# Patient Record
Sex: MALE | Race: WHITE | Employment: FULL TIME | URBAN - METROPOLITAN AREA
[De-identification: names, ages, dates, MRNs, and addresses within clinical notes are randomized per-mention and may not be internally consistent; named-entity substitution may affect disease eponyms.]

---

## 2023-05-15 ENCOUNTER — OFFICE VISIT (OUTPATIENT)
Dept: UROLOGY | Age: 38
End: 2023-05-15

## 2023-05-15 DIAGNOSIS — Z30.09 VASECTOMY EVALUATION: Primary | ICD-10-CM

## 2023-05-15 ASSESSMENT — ENCOUNTER SYMPTOMS
EYES NEGATIVE: 1
RESPIRATORY NEGATIVE: 1

## 2023-05-15 NOTE — PROGRESS NOTES
[place] [address]  [phone]          Aminata Jones  : 1985    Chief Complaint   Patient presents with    Sterilization     Vas consult           HPI     Aminata Jones is a 40 y.o. male    The patient comes in today to discuss vasectomy. He and his wife have 2 children together and are quite sure they want no more. Past Medical History:   Diagnosis Date    Asthma      No past surgical history on file. No current outpatient medications on file. No current facility-administered medications for this visit. No Known Allergies  Social History     Socioeconomic History    Marital status:      Spouse name: Not on file    Number of children: Not on file    Years of education: Not on file    Highest education level: Not on file   Occupational History    Not on file   Tobacco Use    Smoking status: Never    Smokeless tobacco: Never   Substance and Sexual Activity    Alcohol use: Yes    Drug use: Not on file    Sexual activity: Yes     Partners: Female   Other Topics Concern    Not on file   Social History Narrative    Not on file     Social Determinants of Health     Financial Resource Strain: Not on file   Food Insecurity: Not on file   Transportation Needs: Not on file   Physical Activity: Not on file   Stress: Not on file   Social Connections: Not on file   Intimate Partner Violence: Not on file   Housing Stability: Not on file     No family history on file.     Review of Systems  Constitutional: Negative  Skin: Negative skin ROS  Eyes: Eyes negative  ENT: HENT negative  Respiratory: Respiratory negative  Cardiovascular: Neg cardio ROS  GI: Neg GI ROS  Genitourinary: Genitourinary negative  Musculoskeletal: Musculoskeletal negative  Neurological: Neg neuro ROS  Psychological: Neg psych ROS  Endocrine: Endocrine negative  Hem/Lymphatic: Hematologic/lymphatic negative      Assessment and Plan    ICD-10-CM    1. Vasectomy evaluation  Z30.09

## 2023-06-01 ENCOUNTER — OFFICE VISIT (OUTPATIENT)
Dept: UROLOGY | Age: 38
End: 2023-06-01

## 2023-06-01 VITALS — WEIGHT: 140 LBS | BODY MASS INDEX: 23.9 KG/M2 | HEIGHT: 64 IN

## 2023-06-01 DIAGNOSIS — Z30.2 ENCOUNTER FOR VASECTOMY: ICD-10-CM

## 2023-06-01 DIAGNOSIS — Z30.2 ENCOUNTER FOR VASECTOMY: Primary | ICD-10-CM

## 2023-06-01 RX ORDER — HYDROCODONE BITARTRATE AND ACETAMINOPHEN 10; 325 MG/1; MG/1
1 TABLET ORAL
Qty: 12 TABLET | Refills: 0 | Status: SHIPPED | OUTPATIENT
Start: 2023-06-01 | End: 2023-06-01 | Stop reason: SDUPTHER

## 2023-06-01 RX ORDER — HYDROCODONE BITARTRATE AND ACETAMINOPHEN 10; 325 MG/1; MG/1
1 TABLET ORAL
Qty: 12 TABLET | Refills: 0 | Status: SHIPPED | OUTPATIENT
Start: 2023-06-01 | End: 2023-07-01

## 2023-06-01 RX ORDER — CEPHALEXIN 500 MG/1
500 CAPSULE ORAL 3 TIMES DAILY
Qty: 9 CAPSULE | Refills: 0 | Status: SHIPPED | OUTPATIENT
Start: 2023-06-01 | End: 2023-06-01 | Stop reason: SDUPTHER

## 2023-06-01 RX ORDER — CEPHALEXIN 500 MG/1
500 CAPSULE ORAL 3 TIMES DAILY
Qty: 9 CAPSULE | Refills: 0 | Status: SHIPPED | OUTPATIENT
Start: 2023-06-01

## 2024-01-20 ENCOUNTER — APPOINTMENT (OUTPATIENT)
Dept: GENERAL RADIOLOGY | Age: 39
End: 2024-01-20
Payer: COMMERCIAL

## 2024-01-20 ENCOUNTER — HOSPITAL ENCOUNTER (EMERGENCY)
Age: 39
Discharge: HOME | End: 2024-01-20
Payer: COMMERCIAL

## 2024-01-20 VITALS
DIASTOLIC BLOOD PRESSURE: 77 MMHG | TEMPERATURE: 97.6 F | RESPIRATION RATE: 18 BRPM | BODY MASS INDEX: 23.9 KG/M2 | OXYGEN SATURATION: 97 % | SYSTOLIC BLOOD PRESSURE: 128 MMHG | HEIGHT: 64 IN | HEART RATE: 87 BPM | WEIGHT: 140 LBS

## 2024-01-20 DIAGNOSIS — S61.412A LACERATION OF LEFT HAND WITHOUT FOREIGN BODY, INITIAL ENCOUNTER: Primary | ICD-10-CM

## 2024-01-20 PROCEDURE — 99284 EMERGENCY DEPT VISIT MOD MDM: CPT

## 2024-01-20 PROCEDURE — 6360000002 HC RX W HCPCS

## 2024-01-20 PROCEDURE — 12002 RPR S/N/AX/GEN/TRNK2.6-7.5CM: CPT

## 2024-01-20 PROCEDURE — 90714 TD VACC NO PRESV 7 YRS+ IM: CPT

## 2024-01-20 PROCEDURE — 90471 IMMUNIZATION ADMIN: CPT

## 2024-01-20 PROCEDURE — 73130 X-RAY EXAM OF HAND: CPT

## 2024-01-20 RX ORDER — CEPHALEXIN 500 MG/1
500 CAPSULE ORAL 2 TIMES DAILY
Qty: 14 CAPSULE | Refills: 0 | Status: SHIPPED | OUTPATIENT
Start: 2024-01-20 | End: 2024-01-27

## 2024-01-20 RX ORDER — TETANUS AND DIPHTHERIA TOXOIDS ADSORBED 2; 2 [LF]/.5ML; [LF]/.5ML
0.5 INJECTION INTRAMUSCULAR
Status: COMPLETED | OUTPATIENT
Start: 2024-01-20 | End: 2024-01-20

## 2024-01-20 RX ADMIN — TETANUS AND DIPHTHERIA TOXOIDS ADSORBED 0.5 ML: 2; 2 INJECTION INTRAMUSCULAR at 17:54

## 2024-01-20 ASSESSMENT — LIFESTYLE VARIABLES
HOW MANY STANDARD DRINKS CONTAINING ALCOHOL DO YOU HAVE ON A TYPICAL DAY: PATIENT DOES NOT DRINK
HOW OFTEN DO YOU HAVE A DRINK CONTAINING ALCOHOL: NEVER

## 2024-01-20 ASSESSMENT — PAIN SCALES - GENERAL
PAINLEVEL_OUTOF10: 2
PAINLEVEL_OUTOF10: 5

## 2024-01-20 ASSESSMENT — PAIN DESCRIPTION - ORIENTATION: ORIENTATION: LEFT

## 2024-01-20 ASSESSMENT — PAIN DESCRIPTION - PAIN TYPE: TYPE: ACUTE PAIN

## 2024-01-20 ASSESSMENT — PAIN DESCRIPTION - LOCATION: LOCATION: HAND

## 2024-01-20 ASSESSMENT — ENCOUNTER SYMPTOMS: COLOR CHANGE: 0

## 2024-01-20 ASSESSMENT — PAIN DESCRIPTION - FREQUENCY: FREQUENCY: CONTINUOUS

## 2024-01-20 ASSESSMENT — PAIN - FUNCTIONAL ASSESSMENT
PAIN_FUNCTIONAL_ASSESSMENT: 0-10
PAIN_FUNCTIONAL_ASSESSMENT: 0-10

## 2024-01-20 NOTE — ED PROVIDER NOTES
Emergency Department Provider Note       PCP: Unknown, Provider, DO   Age: 38 y.o.   Sex: male     DISPOSITION Decision To Discharge 01/20/2024 06:41:59 PM       ICD-10-CM    1. Laceration of left hand without foreign body, initial encounter  S61.412A [facility] (Hand Surgery)          Medical Decision Making     Complexity of Problems Addressed:  1 or more acute illnesses that pose a threat to life or bodily function.     Data Reviewed and Analyzed:   I independently ordered and reviewed each unique test.     I interpreted the X-rays no foreign body noted on x-ray imaging of the patient's left hand.    Discussion of management or test interpretation.  This patient is a 38-year-old male presents today due to a laceration to the palmar surface of his left hand.  Physical exam of the patient reveals a jagged 2 cm laceration to the palmar surface of his left hand with hemostasis achieved.  Has normal range of motion of his left hand and wrist.  The cut itself is jagged and fairly deep.  X-ray imaging shows no foreign body.  8 simple interrupted sutures were placed after extensive cleaning.  I updated his tetanus shot.  As the patient's wound is deep, I will cover patient with antibiotics sent to his pharmacy.  I also gave him a referral to the orthopedic hand specialist for follow-up next week.  Conservative care measures and return precautions discussed with the patient and his family.  Patient verbalized understanding and agreement with the plan.      Risk of Complications and/or Morbidity of Patient Management:  Prescription drug management performed.  Shared medical decision making was utilized in creating the patients health plan today.    ED Course as of 01/20/24 2113   Sat Jan 20, 2024   1805 The patient states that he is not concerned for a foreign body in his hand.  He states that he thinks it is very unlikely.  I evaluated patient's x-ray and I do not personally see any foreign body.

## 2024-01-20 NOTE — ED TRIAGE NOTES
Left hand laceration on palm area. Cut on metal fence post. Accident occurred approximately 15 minutes ago.

## 2024-01-20 NOTE — DISCHARGE INSTRUCTIONS
Keep the area clean and dry.  Do not submerge in water.  Pat dry after showering with clean dry towel.  Take antibiotics as prescribed.  Return to the emergency department in 7 to 10 days to have the stitches removed.  Return should you experience any symptoms such as redness, swelling, discharge from the area, fever or chills.  These are signs of infection and need to be evaluated by a medical professional.    Please follow up with hand surgery for close re-evaluation next week.    We would love to help you get a primary care doctor for follow-up after your emergency department visit.    Please call [phone] between 7AM - 6PM Monday to Friday.  A care navigator will be able to assist you with setting up a doctor close to your home.

## 2024-01-26 ENCOUNTER — OFFICE VISIT (OUTPATIENT)
Dept: ORTHOPEDIC SURGERY | Age: 39
End: 2024-01-26
Payer: COMMERCIAL

## 2024-01-26 DIAGNOSIS — S61.412A LACERATION OF LEFT HAND, FOREIGN BODY PRESENCE UNSPECIFIED, INITIAL ENCOUNTER: Primary | ICD-10-CM

## 2024-01-26 PROCEDURE — 99203 OFFICE O/P NEW LOW 30 MIN: CPT | Performed by: NURSE PRACTITIONER

## 2024-01-26 NOTE — PROGRESS NOTES
Orthopaedic Hand Clinic Note    Name: Odell Pop  YOB: 1985  Gender: male  MRN: [id number]      CC: Patient referred for evaluation of left hand injury    HPI: Odell Pop is a 38 y.o. male left hand dominant with a chief complaint of left hand injury.  He reports on January 20, 2024 he was trying to jump a fence and fell cutting the palmar aspect of his left hand.  He was seen at the emergency room.  He was x-rayed.  His wound was irrigated and sutured.  He was given updated tetanus vaccine.  He was placed on Keflex twice daily x 7 days.  He reports some pain/discomfort.    ROS/Meds/PSH/PMH/FH/SH: I personally reviewed the patients standard intake form.  Pertinents are discussed in the HPI    Physical Examination:  General: Awake and alert.  HEENT: Normocephalic, atraumatic  CV/Pulm: Breathing even and unlabored  Skin: No obvious rashes noted.  Lymphatic: No obvious evidence of lymphedema or lymphadenopathy    Musculoskeletal Exam:  Examination on the left upper extremity demonstrates cap refill < 5 seconds in all fingers  Patient has a laceration to the palmar aspect of his left hand.  It is sutured.  Sutures are intact.  There is minimal erythema.  No active drainage.  He denies paresthesia.  He has full range of motion.  He has good capillary refill.    Imaging / Electrodiagnostic Tests:     X-rays include a 3 view left hand from the ER independently reviewed and interpreted.  No fracture noted.    Assessment:   1. Laceration of left hand, foreign body presence unspecified, initial encounter        Plan:   We discussed the diagnosis and different treatment options. We discussed observation, therapy, antiinflammatory medications and other pertinent treatment modalities.    After discussing in detail the patient elects to proceed with continuing the course of his antibiotics.  We will reassess his progress back next week for wound check and suture removal.  We have applied a dressing as well

## 2024-01-31 ENCOUNTER — OFFICE VISIT (OUTPATIENT)
Dept: ORTHOPEDIC SURGERY | Age: 39
End: 2024-01-31
Payer: COMMERCIAL

## 2024-01-31 DIAGNOSIS — S61.412A LACERATION OF LEFT HAND, FOREIGN BODY PRESENCE UNSPECIFIED, INITIAL ENCOUNTER: Primary | ICD-10-CM

## 2024-01-31 PROCEDURE — 99213 OFFICE O/P EST LOW 20 MIN: CPT | Performed by: NURSE PRACTITIONER

## 2024-01-31 NOTE — PROGRESS NOTES
Orthopaedic Hand Clinic Note    Name: Odell Pop  YOB: 1985  Gender: male  MRN: [id number]      Follow up visit:   1. Laceration of left hand, foreign body presence unspecified, initial encounter        HPI: Odell Pop is a 38 y.o. male who is following up for left hand laceration.  He is 10 days out from injury.  He says he is doing well.  He says if he stretches his hand he can feel a pulling sensation.    ROS/Meds/PSH/PMH/FH/SH: I personally reviewed the patients standard intake form.  Pertinents are discussed in the HPI    Physical Examination:    Musculoskeletal Examination:  Examination on the left upper extremity demonstrates cap refill < 5 seconds in all fingers  Patient has a laceration to the palmar aspect of his left hand.  It is sutured.  Sutures are intact.  There is minimal erythema.  No active drainage.  He denies paresthesia.  He has full range of motion.  He has good capillary refill.    Imaging / Electrodiagnostic Tests:     None    Assessment:     ICD-10-CM    1. Laceration of left hand, foreign body presence unspecified, initial encounter  S61.412A           Plan:   We discussed the diagnosis and different treatment options. We discussed observation, therapy, antiinflammatory medications and other pertinent treatment modalities.    After discussing in detail the patient elects to proceed with suture removal.  We will give him several more days of dressings.  We discussed scar massage.  He can continue activities as tolerated.  I will see him in 2 weeks if he is having any issues..     Patient voiced accordance and understanding of the information provided and the formulated plan. All questions were answered to the patient's satisfaction during the encounter.    3 This is an acute uncomplicated problem/injury  Treatment at this time:  dressing changes    AARON Cueto - CNP  Orthopaedic Surgery  01/31/24  4:18 PM